# Patient Record
Sex: MALE | Race: WHITE
[De-identification: names, ages, dates, MRNs, and addresses within clinical notes are randomized per-mention and may not be internally consistent; named-entity substitution may affect disease eponyms.]

---

## 2019-11-06 ENCOUNTER — HOSPITAL ENCOUNTER (OUTPATIENT)
Dept: HOSPITAL 90 - RAH | Age: 59
Discharge: HOME | End: 2019-11-06
Attending: INTERNAL MEDICINE
Payer: COMMERCIAL

## 2019-11-06 DIAGNOSIS — Z13.6: Primary | ICD-10-CM

## 2019-11-06 PROCEDURE — 75571 CT HRT W/O DYE W/CA TEST: CPT

## 2024-12-13 ENCOUNTER — HOSPITAL ENCOUNTER (OUTPATIENT)
Dept: HOSPITAL 90 - RAH | Age: 64
Discharge: HOME | End: 2024-12-13
Attending: INTERNAL MEDICINE
Payer: COMMERCIAL

## 2024-12-13 DIAGNOSIS — Z13.6: Primary | ICD-10-CM

## 2024-12-13 PROCEDURE — 75571 CT HRT W/O DYE W/CA TEST: CPT

## 2024-12-13 NOTE — [facility]
CT CORONARY CALCIFICATION SCORING: 



Anatomic images were reviewed. The calcium score is being generated and

reported separately. This report is for the visualized anatomy only.



Visualized portions of the lungs are clear. Hilar and mediastinal

structures appear normal. Osseous structures are unremarkable.



Impression:

1. Negative noncardiac anatomic findings.

2. The calcium score is 0 consistent with absence of calcified plaque.



CT was performed with one or more following dose reduction techniques:

automated exposure control, adjustment of the mA and kv according to

patient's size, or use of a iterative reconstruction technique.